# Patient Record
Sex: FEMALE | Race: WHITE | NOT HISPANIC OR LATINO | Employment: OTHER | URBAN - METROPOLITAN AREA
[De-identification: names, ages, dates, MRNs, and addresses within clinical notes are randomized per-mention and may not be internally consistent; named-entity substitution may affect disease eponyms.]

---

## 2017-07-22 ENCOUNTER — APPOINTMENT (EMERGENCY)
Dept: RADIOLOGY | Facility: HOSPITAL | Age: 82
End: 2017-07-22
Payer: COMMERCIAL

## 2017-07-22 ENCOUNTER — APPOINTMENT (EMERGENCY)
Dept: CT IMAGING | Facility: HOSPITAL | Age: 82
End: 2017-07-22
Payer: COMMERCIAL

## 2017-07-22 ENCOUNTER — HOSPITAL ENCOUNTER (EMERGENCY)
Facility: HOSPITAL | Age: 82
Discharge: HOME | End: 2017-07-22
Attending: EMERGENCY MEDICINE | Admitting: EMERGENCY MEDICINE
Payer: COMMERCIAL

## 2017-07-22 VITALS
BODY MASS INDEX: 22.14 KG/M2 | DIASTOLIC BLOOD PRESSURE: 83 MMHG | RESPIRATION RATE: 18 BRPM | OXYGEN SATURATION: 96 % | HEART RATE: 62 BPM | TEMPERATURE: 98.7 F | WEIGHT: 125 LBS | SYSTOLIC BLOOD PRESSURE: 197 MMHG

## 2017-07-22 DIAGNOSIS — S46.912A LEFT SHOULDER STRAIN, INITIAL ENCOUNTER: ICD-10-CM

## 2017-07-22 DIAGNOSIS — S29.011A CHEST WALL MUSCLE STRAIN, INITIAL ENCOUNTER: ICD-10-CM

## 2017-07-22 DIAGNOSIS — I10 ESSENTIAL HYPERTENSION: ICD-10-CM
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
## 2017-07-22 DIAGNOSIS — S70.02XA CONTUSION OF LEFT HIP, INITIAL ENCOUNTER: ICD-10-CM

## 2017-07-22 LAB
ANION GAP BLD CALC-SCNC: 19 MMOL/L (ref 4–13)
BUN BLD-MCNC: 14 MG/DL (ref 5–25)
CA-I BLD-SCNC: 1.2 MMOL/L (ref 1.12–1.32)
CHLORIDE BLD-SCNC: 103 MMOL/L (ref 100–108)
CREAT BLD-MCNC: 0.8 MG/DL (ref 0.6–1.3)
GFR SERPL CREATININE-BSD FRML MDRD: >60 ML/MIN/1.73SQ M
GLUCOSE SERPL-MCNC: 198 MG/DL (ref 65–140)
HCT VFR BLD CALC: 41 % (ref 34.8–46.1)
HGB BLDA-MCNC: 13.9 G/DL (ref 11.5–15.4)
PCO2 BLD: 25 MMOL/L (ref 21–32)
POTASSIUM BLD-SCNC: 3.7 MMOL/L (ref 3.5–5.3)
SODIUM BLD-SCNC: 142 MMOL/L (ref 136–145)
SPECIMEN SOURCE: ABNORMAL
SPECIMEN SOURCE: NORMAL
TROPONIN I BLD-MCNC: 0.01 NG/ML (ref 0–0.08)

## 2017-07-22 PROCEDURE — 93005 ELECTROCARDIOGRAM TRACING: CPT | Performed by: EMERGENCY MEDICINE

## 2017-07-22 PROCEDURE — 99284 EMERGENCY DEPT VISIT MOD MDM: CPT

## 2017-07-22 PROCEDURE — 71101 X-RAY EXAM UNILAT RIBS/CHEST: CPT

## 2017-07-22 PROCEDURE — 84484 ASSAY OF TROPONIN QUANT: CPT

## 2017-07-22 PROCEDURE — 73030 X-RAY EXAM OF SHOULDER: CPT

## 2017-07-22 PROCEDURE — 80047 BASIC METABLC PNL IONIZED CA: CPT

## 2017-07-22 PROCEDURE — 73502 X-RAY EXAM HIP UNI 2-3 VIEWS: CPT

## 2017-07-22 PROCEDURE — 85014 HEMATOCRIT: CPT

## 2017-07-22 PROCEDURE — 70450 CT HEAD/BRAIN W/O DYE: CPT

## 2017-07-22 RX ORDER — ASPIRIN 81 MG/1
81 TABLET, CHEWABLE ORAL DAILY
COMMUNITY
End: 2018-02-20

## 2017-07-22 RX ORDER — METOPROLOL TARTRATE 50 MG/1
50 TABLET, FILM COATED ORAL ONCE
Status: COMPLETED | OUTPATIENT
Start: 2017-07-22 | End: 2017-07-22

## 2017-07-22 RX ORDER — ACETAMINOPHEN 325 MG/1
650 TABLET ORAL ONCE
Status: COMPLETED | OUTPATIENT
Start: 2017-07-22 | End: 2017-07-22

## 2017-07-22 RX ADMIN — METOPROLOL TARTRATE 50 MG: 50 TABLET ORAL at 21:09

## 2017-07-22 RX ADMIN — ACETAMINOPHEN 650 MG: 325 TABLET ORAL at 20:46

## 2017-07-24 LAB
ATRIAL RATE: 70 BPM
P AXIS: 76 DEGREES
PR INTERVAL: 222 MS
QRS AXIS: -30 DEGREES
QRSD INTERVAL: 86 MS
QT INTERVAL: 434 MS
QTC INTERVAL: 468 MS
T WAVE AXIS: 261 DEGREES
VENTRICULAR RATE: 70 BPM

## 2018-01-15 NOTE — PROCEDURES
Procedures by BLU Rojas at 10/29/2016  2:50 PM      Author:  BLU Rojas Service:  (none) Author Type:  Speech and Language Pathologist     Filed:  10/29/2016  3:21 PM Date of Service:  10/29/2016  2:50 PM Status:  Signed     :  BLU Rojas (Speech and Language Pathologist)                                               Video Swallow Study      Patient Name: Pola King  Today's Date: 10/29/2016  par  par  Past Medical History  Past Medical History     Diagnosis  Date    Diabetes mellitus     Hypertension         Past Surgical History  Past Surgical History      Procedure  Laterality Date    Cardiac surgery      Abdominal aortic aneurysm repair           General Information:  General Information  Type of Study: Initial MBS  Past Medical History: Hx of hydronephrosis, HTN, DM2, falls  Limitations: Reduced attention (Pt distracted by lower back discomfort and by video images)  Positionin* upright in lateral view  Materials Adminstered: Puree, Soft/minced, Soft solid food, 1/2 Barium Pill with thin/thick liquid (did not transfer c thin liq x3 trials so then trialed with HTL), Mixed food c barium liquid  Oral Stage: Mild- Moderate impaired    Oral Phase - Comment: Dentures in place (uppers were loose fitting with movement noted during mastication c soft/solid food)  Reduced bolus formation c episodes of moderate oral residue noted c soft/solid food and mixed food/liquid boluses (mild residue  noted c puree/banana & liquids)  Episodes of mild premature spillage noted c mixed consistency item & consecutive sips of thin liquid   Pt also unable to transfer 1/2 pill when given with thin liquid (despite 3 trials, but transferred when given sip of  HTL)    Pharyngeal Stage:  WFL to Mild impaired with the limited materials taken this pm     Swallow Mechanics  Swallowing Initiation was[de-identified] Prompt, Mildly delayed  Hyloaryngeal Excursion was[de-identified] Adequate, Mildly limited  Tongue Drive was[de-identified] Adequate  Cricopharyngeal Opening/Relaxation was[de-identified] Adequate    Pharyngeal Comment: No aspiration with the limited food & liquids administered today but at risk 2* episodes of reduced attention & reduced oral control  Transient penetration noted c consecutive sips of thin liquid  Mild vallecular residue noted c foods  intermittently  Esophageal Stage:  No gross stasis in the limited with after the limited materials administered today  Assessment Summary:  Mr Ana María Starr presented c s/s mild/moderate oral dysphagia & mild pharyngeal dysphagia as described above  Recommendations:    Primary Recommendations: Oral feeding  Diet Texture: Dysphagia 2 mechanical soft  Liquid Consistency:  Thin  Liquid Administration: Cup, Straw, Individual sips  Medication Administration: Medication crushed  Suggested Postioning: Upright/ 90 degrees  Suggested Precautions: Feed fully upright position, Minimal distraction  Strategies:  SLP to continue skilled assessment/diagnostic tx to assure safe & EFFECTIVE intake of LRD (least restrictive diet), r/o need for strategies (eg to promote bolus cohesion & transfer/oral clearance etc)  Dysphagia Treatment Recommendations: As directed, By clincial status, By medical status                           Received for:Provider  [facility]   Oct 29 2016  3:22PM [facility] Standard Time

## 2018-02-20 ENCOUNTER — APPOINTMENT (EMERGENCY)
Dept: RADIOLOGY | Facility: HOSPITAL | Age: 83
DRG: 392 | End: 2018-02-20
Payer: COMMERCIAL

## 2018-02-20 ENCOUNTER — APPOINTMENT (EMERGENCY)
Dept: CT IMAGING | Facility: HOSPITAL | Age: 83
DRG: 392 | End: 2018-02-20
Payer: COMMERCIAL

## 2018-02-20 ENCOUNTER — HOSPITAL ENCOUNTER (INPATIENT)
Facility: HOSPITAL | Age: 83
LOS: 2 days | Discharge: HOME HEALTH | DRG: 392 | End: 2018-02-22
Attending: EMERGENCY MEDICINE | Admitting: INTERNAL MEDICINE
Payer: COMMERCIAL

## 2018-02-20 DIAGNOSIS — R11.10 VOMITING: ICD-10-CM

## 2018-02-20 DIAGNOSIS — I16.0 HYPERTENSIVE URGENCY: ICD-10-CM

## 2018-02-20 DIAGNOSIS — R55 SYNCOPE, UNSPECIFIED SYNCOPE TYPE: Primary | ICD-10-CM

## 2018-02-20 DIAGNOSIS — E11.65 TYPE 2 DIABETES MELLITUS WITH HYPERGLYCEMIA (HCC): Chronic | ICD-10-CM

## 2018-02-20 PROBLEM — R11.2 NAUSEA AND VOMITING: Status: ACTIVE | Noted: 2018-02-20

## 2018-02-20 LAB
ALBUMIN SERPL BCP-MCNC: 3.4 G/DL (ref 3.5–5)
ALP SERPL-CCNC: 93 U/L (ref 46–116)
ALT SERPL W P-5'-P-CCNC: 11 U/L (ref 12–78)
ANION GAP SERPL CALCULATED.3IONS-SCNC: 14 MMOL/L (ref 4–13)
AST SERPL W P-5'-P-CCNC: 11 U/L (ref 5–45)
BACTERIA UR QL AUTO: ABNORMAL /HPF
BASOPHILS # BLD AUTO: 0.07 THOUSANDS/ΜL (ref 0–0.1)
BASOPHILS NFR BLD AUTO: 0 % (ref 0–1)
BILIRUB SERPL-MCNC: 0.7 MG/DL (ref 0.2–1)
BILIRUB UR QL STRIP: NEGATIVE
BUN SERPL-MCNC: 14 MG/DL (ref 5–25)
CALCIUM SERPL-MCNC: 9.2 MG/DL (ref 8.3–10.1)
CHLORIDE SERPL-SCNC: 102 MMOL/L (ref 100–108)
CLARITY UR: CLEAR
CO2 SERPL-SCNC: 23 MMOL/L (ref 21–32)
COLOR UR: YELLOW
CREAT SERPL-MCNC: 1.1 MG/DL (ref 0.6–1.3)
EOSINOPHIL # BLD AUTO: 0.24 THOUSAND/ΜL (ref 0–0.61)
EOSINOPHIL NFR BLD AUTO: 1 % (ref 0–6)
ERYTHROCYTE [DISTWIDTH] IN BLOOD BY AUTOMATED COUNT: 13 % (ref 11.6–15.1)
GFR SERPL CREATININE-BSD FRML MDRD: 47 ML/MIN/1.73SQ M
GLUCOSE SERPL-MCNC: 246 MG/DL (ref 65–140)
GLUCOSE SERPL-MCNC: 257 MG/DL (ref 65–140)
GLUCOSE SERPL-MCNC: 283 MG/DL (ref 65–140)
GLUCOSE SERPL-MCNC: 284 MG/DL (ref 65–140)
GLUCOSE UR STRIP-MCNC: NEGATIVE MG/DL
HCT VFR BLD AUTO: 38.7 % (ref 34.8–46.1)
HGB BLD-MCNC: 13.1 G/DL (ref 11.5–15.4)
HGB UR QL STRIP.AUTO: ABNORMAL
KETONES UR STRIP-MCNC: NEGATIVE MG/DL
LEUKOCYTE ESTERASE UR QL STRIP: NEGATIVE
LYMPHOCYTES # BLD AUTO: 5.9 THOUSANDS/ΜL (ref 0.6–4.47)
LYMPHOCYTES NFR BLD AUTO: 35 % (ref 14–44)
MAGNESIUM SERPL-MCNC: 1.6 MG/DL (ref 1.6–2.6)
MCH RBC QN AUTO: 30 PG (ref 26.8–34.3)
MCHC RBC AUTO-ENTMCNC: 33.9 G/DL (ref 31.4–37.4)
MCV RBC AUTO: 89 FL (ref 82–98)
MONOCYTES # BLD AUTO: 1.68 THOUSAND/ΜL (ref 0.17–1.22)
MONOCYTES NFR BLD AUTO: 10 % (ref 4–12)
NEUTROPHILS # BLD AUTO: 9.12 THOUSANDS/ΜL (ref 1.85–7.62)
NEUTS SEG NFR BLD AUTO: 54 % (ref 43–75)
NITRITE UR QL STRIP: NEGATIVE
NON-SQ EPI CELLS URNS QL MICRO: ABNORMAL /HPF
OTHER CASTS: ABNORMAL
PH UR STRIP.AUTO: 6 [PH] (ref 4.5–8)
PLATELET # BLD AUTO: 350 THOUSANDS/UL (ref 149–390)
PMV BLD AUTO: 10.3 FL (ref 8.9–12.7)
POTASSIUM SERPL-SCNC: 3.1 MMOL/L (ref 3.5–5.3)
PROT SERPL-MCNC: 7.7 G/DL (ref 6.4–8.2)
PROT UR STRIP-MCNC: NEGATIVE MG/DL
RBC # BLD AUTO: 4.36 MILLION/UL (ref 3.81–5.12)
RBC #/AREA URNS AUTO: ABNORMAL /HPF
SODIUM SERPL-SCNC: 139 MMOL/L (ref 136–145)
SP GR UR STRIP.AUTO: 1.01 (ref 1–1.03)
TROPONIN I SERPL-MCNC: 0.02 NG/ML
UROBILINOGEN UR QL STRIP.AUTO: 0.2 E.U./DL
WBC # BLD AUTO: 17.01 THOUSAND/UL (ref 4.31–10.16)
WBC #/AREA URNS AUTO: ABNORMAL /HPF

## 2018-02-20 PROCEDURE — 87798 DETECT AGENT NOS DNA AMP: CPT | Performed by: EMERGENCY MEDICINE

## 2018-02-20 PROCEDURE — 83735 ASSAY OF MAGNESIUM: CPT | Performed by: EMERGENCY MEDICINE

## 2018-02-20 PROCEDURE — 80053 COMPREHEN METABOLIC PANEL: CPT | Performed by: EMERGENCY MEDICINE

## 2018-02-20 PROCEDURE — 85025 COMPLETE CBC W/AUTO DIFF WBC: CPT | Performed by: EMERGENCY MEDICINE

## 2018-02-20 PROCEDURE — 70450 CT HEAD/BRAIN W/O DYE: CPT

## 2018-02-20 PROCEDURE — 96375 TX/PRO/DX INJ NEW DRUG ADDON: CPT

## 2018-02-20 PROCEDURE — 82948 REAGENT STRIP/BLOOD GLUCOSE: CPT

## 2018-02-20 PROCEDURE — 36415 COLL VENOUS BLD VENIPUNCTURE: CPT | Performed by: EMERGENCY MEDICINE

## 2018-02-20 PROCEDURE — 81001 URINALYSIS AUTO W/SCOPE: CPT | Performed by: EMERGENCY MEDICINE

## 2018-02-20 PROCEDURE — 84484 ASSAY OF TROPONIN QUANT: CPT | Performed by: EMERGENCY MEDICINE

## 2018-02-20 PROCEDURE — 96365 THER/PROPH/DIAG IV INF INIT: CPT

## 2018-02-20 PROCEDURE — 93005 ELECTROCARDIOGRAM TRACING: CPT

## 2018-02-20 PROCEDURE — 84146 ASSAY OF PROLACTIN: CPT | Performed by: EMERGENCY MEDICINE

## 2018-02-20 PROCEDURE — 99223 1ST HOSP IP/OBS HIGH 75: CPT | Performed by: NURSE PRACTITIONER

## 2018-02-20 PROCEDURE — 99285 EMERGENCY DEPT VISIT HI MDM: CPT

## 2018-02-20 PROCEDURE — 71045 X-RAY EXAM CHEST 1 VIEW: CPT

## 2018-02-20 PROCEDURE — 96361 HYDRATE IV INFUSION ADD-ON: CPT

## 2018-02-20 RX ORDER — LABETALOL HYDROCHLORIDE 5 MG/ML
10 INJECTION, SOLUTION INTRAVENOUS EVERY 4 HOURS PRN
Status: DISCONTINUED | OUTPATIENT
Start: 2018-02-20 | End: 2018-02-20

## 2018-02-20 RX ORDER — ONDANSETRON 2 MG/ML
4 INJECTION INTRAMUSCULAR; INTRAVENOUS EVERY 6 HOURS PRN
Status: DISCONTINUED | OUTPATIENT
Start: 2018-02-20 | End: 2018-02-22 | Stop reason: HOSPADM

## 2018-02-20 RX ORDER — DILTIAZEM HYDROCHLORIDE 60 MG/1
60 TABLET, FILM COATED ORAL 2 TIMES DAILY
COMMUNITY

## 2018-02-20 RX ORDER — ONDANSETRON 2 MG/ML
4 INJECTION INTRAMUSCULAR; INTRAVENOUS ONCE
Status: COMPLETED | OUTPATIENT
Start: 2018-02-20 | End: 2018-02-20

## 2018-02-20 RX ORDER — CLOPIDOGREL BISULFATE 75 MG/1
75 TABLET ORAL DAILY
COMMUNITY

## 2018-02-20 RX ORDER — LABETALOL HYDROCHLORIDE 5 MG/ML
20 INJECTION, SOLUTION INTRAVENOUS EVERY 4 HOURS PRN
Status: DISCONTINUED | OUTPATIENT
Start: 2018-02-20 | End: 2018-02-22 | Stop reason: HOSPADM

## 2018-02-20 RX ORDER — LORAZEPAM 2 MG/ML
1 INJECTION INTRAMUSCULAR ONCE
Status: COMPLETED | OUTPATIENT
Start: 2018-02-20 | End: 2018-02-20

## 2018-02-20 RX ORDER — HYDRALAZINE HYDROCHLORIDE 20 MG/ML
10 INJECTION INTRAMUSCULAR; INTRAVENOUS EVERY 6 HOURS PRN
Status: DISCONTINUED | OUTPATIENT
Start: 2018-02-20 | End: 2018-02-22 | Stop reason: HOSPADM

## 2018-02-20 RX ORDER — SODIUM CHLORIDE 9 MG/ML
50 INJECTION, SOLUTION INTRAVENOUS CONTINUOUS
Status: DISCONTINUED | OUTPATIENT
Start: 2018-02-20 | End: 2018-02-22 | Stop reason: HOSPADM

## 2018-02-20 RX ORDER — POTASSIUM CHLORIDE 14.9 MG/ML
20 INJECTION INTRAVENOUS ONCE
Status: COMPLETED | OUTPATIENT
Start: 2018-02-20 | End: 2018-02-20

## 2018-02-20 RX ORDER — LABETALOL HYDROCHLORIDE 5 MG/ML
20 INJECTION, SOLUTION INTRAVENOUS ONCE
Status: COMPLETED | OUTPATIENT
Start: 2018-02-20 | End: 2018-02-20

## 2018-02-20 RX ADMIN — INSULIN LISPRO 2 UNITS: 100 INJECTION, SOLUTION INTRAVENOUS; SUBCUTANEOUS at 23:42

## 2018-02-20 RX ADMIN — LORAZEPAM 1 MG: 2 INJECTION INTRAMUSCULAR; INTRAVENOUS at 20:14

## 2018-02-20 RX ADMIN — SODIUM CHLORIDE 1000 ML: 0.9 INJECTION, SOLUTION INTRAVENOUS at 19:46

## 2018-02-20 RX ADMIN — SODIUM CHLORIDE 50 ML/HR: 0.9 INJECTION, SOLUTION INTRAVENOUS at 23:44

## 2018-02-20 RX ADMIN — ONDANSETRON 4 MG: 2 INJECTION INTRAMUSCULAR; INTRAVENOUS at 20:15

## 2018-02-20 RX ADMIN — ONDANSETRON 4 MG: 2 INJECTION INTRAMUSCULAR; INTRAVENOUS at 19:46

## 2018-02-20 RX ADMIN — SODIUM CHLORIDE 1000 ML: 0.9 INJECTION, SOLUTION INTRAVENOUS at 19:50

## 2018-02-20 RX ADMIN — LABETALOL HYDROCHLORIDE 20 MG: 5 INJECTION, SOLUTION INTRAVENOUS at 21:21

## 2018-02-20 RX ADMIN — POTASSIUM CHLORIDE 20 MEQ: 200 INJECTION, SOLUTION INTRAVENOUS at 20:30

## 2018-02-21 ENCOUNTER — APPOINTMENT (INPATIENT)
Dept: NEUROLOGY | Facility: HOSPITAL | Age: 83
DRG: 392 | End: 2018-02-21
Payer: COMMERCIAL

## 2018-02-21 ENCOUNTER — APPOINTMENT (INPATIENT)
Dept: NON INVASIVE DIAGNOSTICS | Facility: HOSPITAL | Age: 83
DRG: 392 | End: 2018-02-21
Payer: COMMERCIAL

## 2018-02-21 LAB
ANION GAP SERPL CALCULATED.3IONS-SCNC: 11 MMOL/L (ref 4–13)
ATRIAL RATE: 76 BPM
BUN SERPL-MCNC: 16 MG/DL (ref 5–25)
CALCIUM SERPL-MCNC: 8.4 MG/DL (ref 8.3–10.1)
CHLORIDE SERPL-SCNC: 106 MMOL/L (ref 100–108)
CO2 SERPL-SCNC: 20 MMOL/L (ref 21–32)
CREAT SERPL-MCNC: 0.98 MG/DL (ref 0.6–1.3)
ERYTHROCYTE [DISTWIDTH] IN BLOOD BY AUTOMATED COUNT: 13.1 % (ref 11.6–15.1)
FLUAV AG SPEC QL: NORMAL
FLUBV AG SPEC QL: NORMAL
GFR SERPL CREATININE-BSD FRML MDRD: 54 ML/MIN/1.73SQ M
GLUCOSE SERPL-MCNC: 126 MG/DL (ref 65–140)
GLUCOSE SERPL-MCNC: 156 MG/DL (ref 65–140)
GLUCOSE SERPL-MCNC: 162 MG/DL (ref 65–140)
GLUCOSE SERPL-MCNC: 167 MG/DL (ref 65–140)
GLUCOSE SERPL-MCNC: 195 MG/DL (ref 65–140)
HCT VFR BLD AUTO: 33 % (ref 34.8–46.1)
HGB BLD-MCNC: 11.1 G/DL (ref 11.5–15.4)
MCH RBC QN AUTO: 30.7 PG (ref 26.8–34.3)
MCHC RBC AUTO-ENTMCNC: 33.6 G/DL (ref 31.4–37.4)
MCV RBC AUTO: 91 FL (ref 82–98)
P AXIS: 64 DEGREES
PLATELET # BLD AUTO: 251 THOUSANDS/UL (ref 149–390)
PMV BLD AUTO: 10.4 FL (ref 8.9–12.7)
POTASSIUM SERPL-SCNC: 4 MMOL/L (ref 3.5–5.3)
PR INTERVAL: 228 MS
PROLACTIN SERPL-MCNC: 116 NG/ML
QRS AXIS: -39 DEGREES
QRSD INTERVAL: 88 MS
QT INTERVAL: 408 MS
QTC INTERVAL: 459 MS
RBC # BLD AUTO: 3.62 MILLION/UL (ref 3.81–5.12)
RSV B RNA SPEC QL NAA+PROBE: NORMAL
SODIUM SERPL-SCNC: 137 MMOL/L (ref 136–145)
T WAVE AXIS: 72 DEGREES
VENTRICULAR RATE: 76 BPM
WBC # BLD AUTO: 11.49 THOUSAND/UL (ref 4.31–10.16)

## 2018-02-21 PROCEDURE — 82948 REAGENT STRIP/BLOOD GLUCOSE: CPT

## 2018-02-21 PROCEDURE — 80048 BASIC METABOLIC PNL TOTAL CA: CPT | Performed by: NURSE PRACTITIONER

## 2018-02-21 PROCEDURE — 97163 PT EVAL HIGH COMPLEX 45 MIN: CPT

## 2018-02-21 PROCEDURE — 93010 ELECTROCARDIOGRAM REPORT: CPT | Performed by: INTERNAL MEDICINE

## 2018-02-21 PROCEDURE — G8979 MOBILITY GOAL STATUS: HCPCS

## 2018-02-21 PROCEDURE — 99222 1ST HOSP IP/OBS MODERATE 55: CPT | Performed by: INTERNAL MEDICINE

## 2018-02-21 PROCEDURE — 85027 COMPLETE CBC AUTOMATED: CPT | Performed by: NURSE PRACTITIONER

## 2018-02-21 PROCEDURE — 93306 TTE W/DOPPLER COMPLETE: CPT | Performed by: INTERNAL MEDICINE

## 2018-02-21 PROCEDURE — G8978 MOBILITY CURRENT STATUS: HCPCS

## 2018-02-21 PROCEDURE — 99232 SBSQ HOSP IP/OBS MODERATE 35: CPT | Performed by: HOSPITALIST

## 2018-02-21 PROCEDURE — 97530 THERAPEUTIC ACTIVITIES: CPT

## 2018-02-21 PROCEDURE — 95816 EEG AWAKE AND DROWSY: CPT

## 2018-02-21 PROCEDURE — 95816 EEG AWAKE AND DROWSY: CPT | Performed by: PSYCHIATRY & NEUROLOGY

## 2018-02-21 PROCEDURE — 93306 TTE W/DOPPLER COMPLETE: CPT

## 2018-02-21 RX ORDER — BENAZEPRIL HYDROCHLORIDE 10 MG/1
5 TABLET ORAL DAILY
Status: DISCONTINUED | OUTPATIENT
Start: 2018-02-21 | End: 2018-02-22 | Stop reason: HOSPADM

## 2018-02-21 RX ORDER — CLOPIDOGREL BISULFATE 75 MG/1
75 TABLET ORAL DAILY
Status: DISCONTINUED | OUTPATIENT
Start: 2018-02-21 | End: 2018-02-22 | Stop reason: HOSPADM

## 2018-02-21 RX ORDER — ATORVASTATIN CALCIUM 40 MG/1
40 TABLET, FILM COATED ORAL
Status: DISCONTINUED | OUTPATIENT
Start: 2018-02-21 | End: 2018-02-22 | Stop reason: HOSPADM

## 2018-02-21 RX ADMIN — SODIUM CHLORIDE 50 ML/HR: 0.9 INJECTION, SOLUTION INTRAVENOUS at 20:02

## 2018-02-21 RX ADMIN — BENAZEPRIL HYDROCHLORIDE 5 MG: 10 TABLET, FILM COATED ORAL at 12:46

## 2018-02-21 RX ADMIN — ATORVASTATIN CALCIUM 40 MG: 40 TABLET, FILM COATED ORAL at 16:48

## 2018-02-21 RX ADMIN — CLOPIDOGREL BISULFATE 75 MG: 75 TABLET ORAL at 12:41

## 2018-02-21 RX ADMIN — INSULIN LISPRO 1 UNITS: 100 INJECTION, SOLUTION INTRAVENOUS; SUBCUTANEOUS at 12:41

## 2018-02-21 RX ADMIN — INSULIN LISPRO 1 UNITS: 100 INJECTION, SOLUTION INTRAVENOUS; SUBCUTANEOUS at 16:49

## 2018-02-21 RX ADMIN — ENOXAPARIN SODIUM 40 MG: 40 INJECTION SUBCUTANEOUS at 08:21

## 2018-02-21 NOTE — ASSESSMENT & PLAN NOTE
-currently in sinus rhythm  -patient is not on anticoagulation home  -Cardizem was on hold  Patient was bradycardic overnight although she did receive a dose of labetalol    Consider restarting Cardizem today  -echocardiogram today  -cardiology consult

## 2018-02-21 NOTE — CASE MANAGEMENT
Initial Clinical Review    Admission: Date/Time/Statement: 2/20/18 @ 2127     Orders Placed This Encounter   Procedures    Inpatient Admission (expected length of stay for this patient is greater than two midnights)     Standing Status:   Standing     Number of Occurrences:   1     Order Specific Question:   Admitting Physician     Answer:   Julius Maki [[id number]]     Order Specific Question:   Level of Care     Answer:   Med Surg [16]     Order Specific Question:   Estimated length of stay     Answer:   More than 2 Midnights     Order Specific Question:   Certification     Answer:   I certify that inpatient services are medically necessary for this patient for a duration of greater than two midnights  See H&P and MD Progress Notes for additional information about the patient's course of treatment  ED: Date/Time/Mode of Arrival:   ED Arrival Information     Expected Arrival Acuity Means of Arrival Escorted By Service Admission Type    - 2/20/2018 19:05 Urgent Wheelchair Family Member General Medicine Urgent    Arrival Complaint    SEIZURE          Chief Complaint:   Chief Complaint   Patient presents with    Syncope     Vague description of pt sitting in chair at home and "became unresponsive and maybe had seizure "  Family denies any movement/shaking of extremities  Pt oriented upon arrival; vomited in car and upon arrival   c/o being tired today  History of Illness: 80 y o  female with history of PAF, dementia, diabetes mellitus type 2, and strokes who presents from home after short period of unresponsiveness  Family told ED doctor that pt has not been feeling well x 2 days  She was sitting down to eat dinner when she vomited and had period of unresponsiveness  Son did finger sweep to make sure pt would not choke on vomit  Pt regained consciousness   She was c/o dizziness and vomited several more times in route and upon arrival to ED  K-3 1, glucose-257, WBC-17 01  CT of head and cxr negative    ED Vital Signs:   ED Triage Vitals   Temperature Pulse Respirations Blood Pressure SpO2   02/20/18 1912 02/20/18 1912 02/20/18 1912 02/20/18 1912 02/20/18 1912   99 4 °F (37 4 °C) 78 16 (!) 187/93 97 %      Temp Source Heart Rate Source Patient Position - Orthostatic VS BP Location FiO2 (%)   02/20/18 1912 02/20/18 1912 02/20/18 2236 02/20/18 2015 --   Temporal Monitor Lying Right arm       Pain Score       02/20/18 1912       No Pain        Wt Readings from Last 1 Encounters:   02/20/18 59 8 kg (131 lb 13 4 oz)       Vital Signs (abnormal):  /93 - 185/84  Respiratory rate varies to 28  Maximum temperature 99 4   Exam - distressed  Generalized weakness  Diaphoretic  Active vomiting  Abnormal Labs/Diagnostic Test Results:   Prolactin 116  UA trace blood    k 3 1  Anion gap 14  Glucose 257  Alt 11  Albumin 3 4  Wbc 17 01    Ct head - no acute intracranial pathology    2322 glucose 284  Labs 2/21-  Glucose 167  CO2-20      Wbc 11 49, hgb 11 1, hct 33    ED Treatment:   Medication Administration from 02/20/2018 1905 to 02/20/2018 2226       Date/Time Order Dose Route Action Action by Comments     02/20/2018 2122 sodium chloride 0 9 % bolus 1,000 mL 0 mL Intravenous Stopped Temple Bullion, RN      02/20/2018 1950 sodium chloride 0 9 % bolus 1,000 mL 1,000 mL Intravenous [address] [employer] Bullion, RN      02/20/2018 1946 sodium chloride 0 9 % bolus 1,000 mL 1,000 mL Intravenous [address] [employer] Bullion, RN      02/20/2018 1946 ondansetron (ZOFRAN) injection 4 mg 4 mg Intravenous Given Temple Bullion, RN      02/20/2018 2014 LORazepam (ATIVAN) 2 mg/mL injection 1 mg 1 mg Intravenous Given Temple Bullion, RN      02/20/2018 2015 ondansetron (ZOFRAN) injection 4 mg 4 mg Intravenous Given Temple Bullion, RN      02/20/2018 2221 potassium chloride 20 mEq IVPB (premix) 0 mEq Intravenous Stopped Temple Bullion, RN      02/20/2018 2030 potassium chloride 20 mEq IVPB (premix) 20 mEq Intravenous New Bag Newton Del Castillo RN      02/20/2018 2121 labetalol (NORMODYNE) injection 20 mg 20 mg Intravenous Given Newton Del Castillo RN           Past Medical/Surgical History: Active Ambulatory Problems     Diagnosis Date Noted    Accelerated hypertension 10/26/2016    Type 2 diabetes mellitus with hyperglycemia [facility] ) 10/26/2016    Breast nodule 10/26/2016    Hypokalemia 10/28/2016    Left-sided weakness 10/28/2016    Dysphagia 10/28/2016    CVA (cerebral vascular accident) ([facility] ) 10/29/2016    [facility] 11/24/2016    Bleeding on Coumadin 11/24/2016    Traumatic cephalohematoma 11/24/2016    Aortic aneurysm [facility])      Resolved Ambulatory Problems     Diagnosis Date Noted    SIRS (systemic inflammatory response syndrome) [facility] ) 10/26/2016    Altered mental status, unspecified 10/26/2016    Fall 10/26/2016    Hydronephrosis of right kidney 10/26/2016    RAH (acute kidney injury) ([facility] ) 10/26/2016    Elevated lactic acid level 10/26/2016    Hematuria 10/26/2016    Tongue swelling 10/27/2016    Stroke (HCC)     Hypertension     Diabetes mellitus ([facility] )      Past Medical History:   Diagnosis Date    [facility]fib [facility] )     Aortic aneurysm (HCC)     Dementia     Diabetes mellitus ([facility] )     Hypertension     Intracranial hematoma (HCC)     Stroke ([facility] )     UTI (urinary tract infection)        Admitting Diagnosis: Seizure ([facility] ) [R56 9]  Vomiting [R11 10]  Hypertensive urgency [I16 0]  Syncope, unspecified syncope type [R55]    Age/Sex: 80 y o  female    Assessment/Plan:   Syncope   Assessment & Plan     Will monitor on telemetry  EKG with change in rhythm  Orthostatic BPs  Neuro checks Q4H  OOB with assist  Will obtain echocardiogram to R/O cardiac cause  EEG to R/O seizure            Nausea and vomiting   Assessment & Plan     May be viral in nature  WBC-17 01  Will keep NPO for now  Zofran PRN nausea and vomiting   Recheck BMP in am and replete electrolytes as needed            Type 2 diabetes mellitus with hyperglycemia ([facility] )   Assessment & Plan     Hold metformin while NPO  Monitor blood glucose QAC and HS and initiate SSI            Accelerated hypertension   Assessment & Plan     Pt NPO for now due to vomiting  Will hold benazepril and diltiazem  Add Labetalol 10mg IV PRN SBP>170  Monitor BP per nursing unit            Paroxysmal atrial fibrillation (HCC)   Assessment & Plan     Currently in SR with first degree AV block and EKG  Will continue to monitor on telemetry  Restart diltiazem and plavix when tolerating PO intake            Dementia   Assessment & Plan     Call bell in reach  OOB with assist            CVA (cerebral vascular accident) [facility])   Assessment & Plan     CT of head negative for acute infarct or hemorrhage  Restart Plavix when tolerating diet            Hypokalemia   Assessment & Plan     K-3 1 in ED  Repleted with KCL [id number]  Will recheck BMP in am and replete electrolytes as needed            Admission Orders:  TELE  2/20/2018 2125 INPATIENT   Scheduled Meds:   Current Facility-Administered Medications:  enoxaparin 40 mg Subcutaneous Daily TOR Gustafson    hydrALAZINE 10 mg Intravenous Q6H PRN TOR Chen    insulin lispro 1-5 Units Subcutaneous TID AC LolaBETZY FerrariNP    insulin lispro 1-5 Units Subcutaneous HS TOR Gustafson    labetalol 20 mg Intravenous Q4H PRN Merline Root MacritchBETZY armentaNP    ondansetron 4 mg Intravenous Q6H PRN Merline Root Macritchie, CRNP    sodium chloride 50 mL/hr Intravenous Continuous TOR Gustafson Last Rate: 50 mL/hr (02/20/18 2344)     Continuous Infusions:   sodium chloride 50 mL/hr Last Rate: 50 mL/hr (02/20/18 2344)     PRN Meds:nto used:   hydrALAZINE    labetalol    ondansetron     OTHER ORDERS:  Fingerstick glucose qid  Neuro checks q 4h  scds  NPO  Echo  EEG    Per attending on 2/12-   Nausea and vomiting   Assessment & Plan     -likely viral gastroenteritis  -advance diet as tolerated  -leukocytosis, present on admission, is improving  This is likely related to viral gastroenteritis           Paroxysmal atrial fibrillation [facility])   Assessment & Plan     -currently in sinus rhythm  -patient is not on anticoagulation home  -Cardizem was on hold  Patient was bradycardic overnight although she did receive a dose of labetalol  Consider restarting Cardizem today  -echocardiogram today  -cardiology consult          CVA (cerebral vascular accident) ([facility] )   Assessment & Plan     -restart Plavix  -start statin          Type 2 diabetes mellitus with hyperglycemia ([facility] )   Assessment & Plan     -restart metformin  -continue sliding scale insulin          Accelerated hypertension   Assessment & Plan     -restart ACE-inhibitor  -may need to increase ACE-inhibitor we cannot restart her Cardizem (bradycardia)  -currently hypertensive urgency          * Syncope   Assessment & Plan     -check echocardiogram  -continue to monitor on telemetry  -consult Cardiology  -likely vasovagal due to vomiting           Thank you,  [facility] in the [facility] by Randall Self for 2017  Network Utilization Review Department  Phone: [phone]; Fax [phone]  ATTENTION: The Network Utilization Review Department is now centralized for our 7 Facilities  Make a note that we have a new phone and fax numbers for our Department  Please call with any questions or concerns to [phone] and carefully follow the prompts so that you are directed to the right person  All voicemails are confidential  Fax any determinations, approvals, denials, and requests for initial or continue stay review clinical to [phone]  Due to HIGH CALL volume, it would be easier if you could please send faxed requests to expedite your requests and in part, help us provide discharge notifications faster

## 2018-02-21 NOTE — ASSESSMENT & PLAN NOTE
-likely viral gastroenteritis  -advance diet as tolerated  -leukocytosis, present on admission, is improving  This is likely related to viral gastroenteritis

## 2018-02-21 NOTE — PLAN OF CARE
Problem: PHYSICAL THERAPY ADULT  Goal: Performs mobility at highest level of function for planned discharge setting  See evaluation for individualized goals  Treatment/Interventions: ADL retraining, Functional transfer training, LE strengthening/ROM, Therapeutic exercise, Endurance training, Cognitive reorientation, Patient/family training, Equipment eval/education, Bed mobility, Gait training, Spoke to MD  Equipment Recommended: Pierre Mendenhall       See flowsheet documentation for full assessment, interventions and recommendations  Outcome: Progressing  Prognosis: Good     Assessment: Did well with rw for brief moiblity  Instr to ring for staff prior to toileting  Tx initiated for safe transfers form cahir reach and push   Will benefit form skill PT service to 1 final visit  to re-assess for longer distance See katelyn  Barriers to Discharge:  (medical status)     Recommendation: Home with family support, Home PT          See flowsheet documentation for full assessment

## 2018-02-21 NOTE — ASSESSMENT & PLAN NOTE
-restart ACE-inhibitor  -may need to increase ACE-inhibitor we cannot restart her Cardizem (bradycardia)  -currently hypertensive urgency

## 2018-02-21 NOTE — ASSESSMENT & PLAN NOTE
Currently in SR with first degree AV block and EKG  Will continue to monitor on telemetry  Restart diltiazem and plavix when tolerating PO intake

## 2018-02-21 NOTE — SOCIAL WORK
Met with patient  Explained role of care management  Patient lives with son Sloan King in a two story home with 2 DAYNE  Daughter in law, Vanessa Gama assists with adl's, uses RW to ambulate, family transports, provides meals  Patient states that her son and DIL work outside the home during the day, so she is alone  DME - RW, w/c  Past services - [facility] - not sure of agency  She is not sure if she wants [facility] to follow  [facility] for daughter Sg Monzon re: discharge plan

## 2018-02-21 NOTE — PROGRESS NOTES
Pt observed to be sleeping during hrly rounds between assessments; denies discomfort; neuro checks unchanged  Awake and responsive when spoken to  Incont lg amt foul smelling urine

## 2018-02-21 NOTE — ED NOTES
Called to CT - when moved onto CT table pt c/o dizziness and vomited    Orders received from Dr Gracie Carcamo, RN  02/20/18 2027

## 2018-02-21 NOTE — ASSESSMENT & PLAN NOTE
May be viral in nature  WBC-17 01  Will keep NPO for now  Zofran PRN nausea and vomiting  Recheck BMP in am and replete electrolytes as needed

## 2018-02-21 NOTE — ED NOTES
Contacted Med/surg to provide 10 min heads up  Memory Concetta (Nsg supervisor) states the room is not ready yet - they have to move patients, will call when bed/room is ready       Mariama Randhawa RN  02/20/18 3519

## 2018-02-21 NOTE — ASSESSMENT & PLAN NOTE
Will monitor on telemetry  EKG with change in rhythm  Orthostatic BPs  Neuro checks Q4H  OOB with assist  Will obtain echocardiogram to R/O cardiac cause  EEG to R/O seizure

## 2018-02-21 NOTE — CONSULTS
Consultation - Cardiology   Narda Harman 80 y o  female MRN: [id number]  Unit/Bed#: [address] [place] [address] [id number] Encounter: [id number]    Assessment/Plan     Assessment:  Syncope  PAF  History of CVA  Plan:    Her syncope was possibly vasovagal  She has a flow murmur on exam  Will review her echocardiogram once it is completed  She remains in sinus rhythm and has not had any heart block on tele  Maintain hydration  If echo unremarkable, then no further workup is needed  History of Present Illness   Physician Requesting Consult: Rony Dunn MD  Reason for Consult / Principal Problem: Syncope  HPI: Narda Harman is a 80y o  year old female who presents with an episode of syncope  The patient was eating dinner, developed nausea and vomiting and then lost consciousness  She was subsequently brought to the ER  She lives with her son  She previously saw a cardiologist in [address] [place] [address] 1788  She has a prior history of CVA and PAF  She is sedentary  At rest she has no chest pain, no dyspnea, no palpitations, no orthopnea, no PND  She has some mild edema  History is limited due to her dementia  Inpatient consult to Cardiology  Consult performed by: Marie Hylton  Consult ordered by: Mike Aparicio          Review of Systems   Constitutional: Negative  HENT: Negative  Eyes: Negative  Respiratory: Negative  Cardiovascular: Negative  Gastrointestinal: Negative  Endocrine: Negative  Genitourinary: Negative  Musculoskeletal: Negative  Skin: Negative  Allergic/Immunologic: Negative  Neurological: Positive for syncope  Hematological: Negative  Psychiatric/Behavioral: Positive for confusion         Historical Information   Past Medical History:   Diagnosis Date    A-fib [facility])     [facility]     small leak    Dementia     Diabetes mellitus ([facility] )     Hypertension     Intracranial hematoma (HCC)     Stroke ([facility] )     UTI (urinary tract infection)      Past Surgical History: Procedure Laterality Date    ABDOMINAL AORTIC ANEURYSM REPAIR      HYSTERECTOMY       History   Alcohol Use No     History   Drug Use No     History   Smoking Status    Never Smoker   Smokeless Tobacco    Never Used     Family History: History reviewed  No pertinent family history  Meds/Allergies   current meds:   Current Facility-Administered Medications   Medication Dose Route Frequency    atorvastatin (LIPITOR) tablet 40 mg  40 mg Oral Daily With Dinner    benazepril (LOTENSIN) tablet 5 mg  5 mg Oral Daily    clopidogrel (PLAVIX) tablet 75 mg  75 mg Oral Daily    enoxaparin (LOVENOX) subcutaneous injection 40 mg  40 mg Subcutaneous Daily    hydrALAZINE (APRESOLINE) injection 10 mg  10 mg Intravenous Q6H PRN    insulin lispro (HumaLOG) 100 units/mL subcutaneous injection 1-5 Units  1-5 Units Subcutaneous TID AC    insulin lispro (HumaLOG) 100 units/mL subcutaneous injection 1-5 Units  1-5 Units Subcutaneous HS    labetalol (NORMODYNE) injection 20 mg  20 mg Intravenous Q4H PRN    [START ON 2/22/2018] metFORMIN (GLUCOPHAGE) tablet 500 mg  500 mg Oral Daily With Breakfast    ondansetron (ZOFRAN) injection 4 mg  4 mg Intravenous Q6H PRN    sodium chloride 0 9 % infusion  50 mL/hr Intravenous Continuous     Allergies   Allergen Reactions    Demerol [Meperidine]     Morphine And Related     Penicillins     Vicodin [Hydrocodone-Acetaminophen]        Objective   Vitals: Blood pressure 128/61, pulse 78, temperature 98 7 °F (37 1 °C), temperature source Oral, resp  rate 18, height 5' 3" (1 6 m), weight 59 8 kg (131 lb 13 4 oz), SpO2 96 %    Orthostatic Blood Pressures    Flowsheet Row Most Recent Value   Blood Pressure  128/61 filed at 02/21/2018 8872   Patient Position - Orthostatic VS  Lying filed at 02/21/2018 0814            Intake/Output Summary (Last 24 hours) at 02/21/18 1424  Last data filed at 02/21/18 1138   Gross per 24 hour   Intake          1413 33 ml   Output              677 ml   Net 736 33 ml       Invasive Devices     Peripheral Intravenous Line            Peripheral IV 02/20/18 Left Arm 1 day                Physical Exam   Constitutional: She is oriented to person, place, and time  No distress  HENT:   Mouth/Throat: No oropharyngeal exudate  Eyes: No scleral icterus  Neck: No JVD present  Cardiovascular: Normal rate and regular rhythm  Murmur heard  Pulmonary/Chest: Effort normal and breath sounds normal  No respiratory distress  She has no wheezes  She has no rales  Abdominal: Soft  Bowel sounds are normal  She exhibits no distension  There is no tenderness  There is no rebound  Musculoskeletal: She exhibits edema  Neurological: She is alert and oriented to person, place, and time  Skin: Skin is warm and dry  She is not diaphoretic  Psychiatric: She has a normal mood and affect  Her behavior is normal        Lab Results:   I have personally reviewed pertinent lab results      CBC with diff:   Results from last 7 days  Lab Units 02/21/18  0512   WBC Thousand/uL 11 49*   RBC Million/uL 3 62*   HEMOGLOBIN g/dL 11 1*   HEMATOCRIT % 33 0*   MCV fL 91   MCH pg 30 7   MCHC g/dL 33 6   RDW % 13 1   MPV fL 10 4   PLATELETS Thousands/uL 251     CMP:   Results from last 7 days  Lab Units 02/21/18  0512 02/20/18  1922   SODIUM mmol/L 137 139   POTASSIUM mmol/L 4 0 3 1*   CHLORIDE mmol/L 106 102   CO2 mmol/L 20* 23   ANION GAP mmol/L 11 14*   BUN mg/dL 16 14   CREATININE mg/dL 0 98 1 10   GLUCOSE RANDOM mg/dL 156* 257*   CALCIUM mg/dL 8 4 9 2   AST U/L  --  11   ALT U/L  --  11*   ALK PHOS U/L  --  93   TOTAL PROTEIN g/dL  --  7 7   BILIRUBIN TOTAL mg/dL  --  0 70   EGFR ml/min/1 73sq m 54 47     Troponin:   0  Lab Value Date/Time   TROPONINI 0 02 02/20/2018 1922     BNP:   Results from last 7 days  Lab Units 02/21/18  0512   SODIUM mmol/L 137   POTASSIUM mmol/L 4 0   CHLORIDE mmol/L 106   CO2 mmol/L 20*   ANION GAP mmol/L 11   BUN mg/dL 16   CREATININE mg/dL 0 98   GLUCOSE RANDOM mg/dL 156*   CALCIUM mg/dL 8 4   EGFR ml/min/1 73sq m 54     Coags:     TSH:     Magnesium:   Results from last 7 days  Lab Units 02/20/18 1922   MAGNESIUM mg/dL 1 6     Lipid Profile:     Imaging: I have personally reviewed pertinent films in PACS  EKG: Normal Sinus Rhythm  Left Axis  Code Status: Level 3 - DNAR and DNI  Advance Directive and Living Will: Yes    Power of :    POLST:      Counseling / Coordination of Care  Total floor / unit time spent today 45 minutes  Greater than 50% of total time was spent with the patient and / or family counseling and / or coordination of care  A description of the counseling / coordination of care

## 2018-02-21 NOTE — ED PROVIDER NOTES
History  Chief Complaint   Patient presents with    Syncope     Vague description of pt sitting in chair at home and "became unresponsive and maybe had seizure "  Family denies any movement/shaking of extremities  Pt oriented upon arrival; vomited in car and upon arrival   c/o being tired today  Patient brought in by children, felt tired all day, had turkey burger and vomited twice just prior to arrival   Passed out about 10 minute timeframe per son  She was gagging on food so he did a finger sweep  She took a big breath in  He noticed twitching in an arm while she was passed out while sitting at kitchen table, witnessed by children  Vomited upon arrival to hospital   Feels dizzy  Prior to Admission Medications   Prescriptions Last Dose Informant Patient Reported? Taking? BENAZEPRIL HCL PO   Yes Yes   Sig: Take 5 mg by mouth daily   clopidogrel (PLAVIX) 75 mg tablet   Yes Yes   Sig: Take 75 mg by mouth daily   diltiazem (CARDIZEM) 60 mg tablet   Yes Yes   Sig: Take 60 mg by mouth 2 (two) times a day   metFORMIN (GLUCOPHAGE) 1000 MG tablet   Yes No   Sig: Take 500 mg by mouth daily with breakfast        Facility-Administered Medications: None       Past Medical History:   Diagnosis Date    A-fib ([place] [id number] )     Aortic aneurysm (HCC)     small leak    Dementia     Diabetes mellitus ([facility] )     Hypertension     Intracranial hematoma (HCC)     Stroke ([facility] )     UTI (urinary tract infection)        Past Surgical History:   Procedure Laterality Date    ABDOMINAL AORTIC ANEURYSM REPAIR      HYSTERECTOMY         History reviewed  No pertinent family history  I have reviewed and agree with the history as documented  Social History   Substance Use Topics    Smoking status: Never Smoker    Smokeless tobacco: Never Used    Alcohol use No        Review of Systems   Constitutional: Negative for chills and fever  HENT: Negative for rhinorrhea and sore throat      Respiratory: Negative for shortness of breath  Cardiovascular: Negative for chest pain  Gastrointestinal: Positive for nausea and vomiting  Negative for constipation and diarrhea  Genitourinary: Negative for dysuria and frequency  Skin: Negative for rash  Neurological: Positive for dizziness  All other systems reviewed and are negative  Physical Exam  ED Triage Vitals   Temperature Pulse Respirations Blood Pressure SpO2   02/20/18 1912 02/20/18 1912 02/20/18 1912 02/20/18 1912 02/20/18 1912   99 4 °F (37 4 °C) 78 16 (!) 187/93 97 %      Temp Source Heart Rate Source Patient Position - Orthostatic VS BP Location FiO2 (%)   02/20/18 1912 02/20/18 1912 02/20/18 2236 02/20/18 2015 --   Temporal Monitor Lying Right arm       Pain Score       02/20/18 1912       No Pain           Orthostatic Vital Signs  Vitals:    02/20/18 2145 02/20/18 2200 02/20/18 2236 02/20/18 2339   BP: 161/82 160/87 (!) 185/84 143/60   Pulse:   76 68   Patient Position - Orthostatic VS:   Lying        Physical Exam   Constitutional: She is oriented to person, place, and time  She appears distressed  HENT:   Mouth/Throat: Oropharynx is clear and moist    Eyes: Conjunctivae and EOM are normal  Pupils are equal, round, and reactive to light  Neck: Normal range of motion  Neck supple  Cardiovascular: Normal rate, regular rhythm, normal heart sounds and intact distal pulses  Pulmonary/Chest: Effort normal and breath sounds normal  No respiratory distress  She has no wheezes  Abdominal: Soft  Bowel sounds are normal  She exhibits no distension  There is no tenderness  Musculoskeletal: Normal range of motion  Generalized weakness   Neurological: She is alert and oriented to person, place, and time  No cranial nerve deficit or sensory deficit  She displays a negative Romberg sign  Skin: Skin is warm  She is diaphoretic  Nursing note and vitals reviewed        ED Medications  Medications   sodium chloride 0 9 % infusion (50 mL/hr Intravenous New Bag 2/20/18 2344)   ondansetron (ZOFRAN) injection 4 mg (not administered)   enoxaparin (LOVENOX) subcutaneous injection 40 mg (not administered)   insulin lispro (HumaLOG) 100 units/mL subcutaneous injection 1-5 Units (not administered)   insulin lispro (HumaLOG) 100 units/mL subcutaneous injection 1-5 Units (2 Units Subcutaneous Given 2/20/18 2342)   labetalol (NORMODYNE) injection 20 mg (not administered)   hydrALAZINE (APRESOLINE) injection 10 mg (not administered)   sodium chloride 0 9 % bolus 1,000 mL (0 mL Intravenous Stopped 2/20/18 2122)   ondansetron (ZOFRAN) injection 4 mg (4 mg Intravenous Given 2/20/18 1946)   LORazepam (ATIVAN) 2 mg/mL injection 1 mg (1 mg Intravenous Given 2/20/18 2014)   ondansetron (ZOFRAN) injection 4 mg (4 mg Intravenous Given 2/20/18 2015)   potassium chloride 20 mEq IVPB (premix) (0 mEq Intravenous Stopped 2/20/18 2221)   labetalol (NORMODYNE) injection 20 mg (20 mg Intravenous Given 2/20/18 2121)       Diagnostic Studies  Results Reviewed     Procedure Component Value Units Date/Time    Influenza A/B and RSV by PCR (indicated for patients >2 mo of age) [[id number]] Collected:  02/20/18 2126    Lab Status: In process Specimen:  Nasopharyngeal from Nasopharyngeal Swab Updated:  02/20/18 2344    Prolactin [[id number]] Collected:  02/20/18 1922    Lab Status:   In process Specimen:  Blood Updated:  02/20/18 2130    Urine Microscopic [[id number]]  (Abnormal) Collected:  02/20/18 2023    Lab Status:  Final result Specimen:  Urine from Urine, Straight Cath Updated:  02/20/18 2057     RBC, UA 1-2 (A) /hpf      WBC, UA 1-2 (A) /hpf      Epithelial Cells Occasional /hpf      Bacteria, UA None Seen /hpf      Other Casts Waxy Casts    Narrative:       0-1 Waxy Casts    UA w Reflex to Microscopic w Reflex to Culture [[id number]]  (Abnormal) Collected:  02/20/18 2023    Lab Status:  Final result Specimen:  Urine from Urine, Straight Cath Updated:  02/20/18 2046     Color, UA Yellow     Clarity, UA Clear     Specific [place], UA 1 010     pH, UA 6 0     Leukocytes, UA Negative     Nitrite, UA Negative     Protein, UA Negative mg/dl      Glucose, UA Negative mg/dl      Ketones, UA Negative mg/dl      Urobilinogen, UA 0 2 E U /dl      Bilirubin, UA Negative     Blood, UA Trace-Intact (A)    Magnesium [[id number]]  (Normal) Collected:  02/20/18 1922    Lab Status:  Final result Specimen:  Blood Updated:  02/20/18 2008     Magnesium 1 6 mg/dL     Troponin I [[id number]]  (Normal) Collected:  02/20/18 1922    Lab Status:  Final result Specimen:  Blood from Arm, Left Updated:  02/20/18 1947     Troponin I 0 02 ng/mL     Narrative:         [employer] Chemistry analyzer 99% cutoff is > 0 04 ng/mL in network labs    o cTnI 99% cutoff is useful only when applied to patients in the clinical setting of myocardial ischemia  o cTnI 99% cutoff should be interpreted in the context of clinical history, ECG findings and possibly cardiac imaging to establish correct diagnosis  o cTnI 99% cutoff may be suggestive but clearly not indicative of a coronary event without the clinical setting of myocardial ischemia  Comprehensive metabolic panel [[id number]]  (Abnormal) Collected:  02/20/18 1922    Lab Status:  Final result Specimen:  Blood from Arm, Left Updated:  02/20/18 1945     Sodium 139 mmol/L      Potassium 3 1 (L) mmol/L      Chloride 102 mmol/L      CO2 23 mmol/L      Anion Gap 14 (H) mmol/L      BUN 14 mg/dL      Creatinine 1 10 mg/dL      Glucose 257 (H) mg/dL      Calcium 9 2 mg/dL      AST 11 U/L      ALT 11 (L) U/L      Alkaline Phosphatase 93 U/L      Total Protein 7 7 g/dL      Albumin 3 4 (L) g/dL      Total Bilirubin 0 70 mg/dL      eGFR 47 ml/min/1 73sq m     Narrative:         National Kidney Disease Education Program recommendations are as follows:  GFR calculation is accurate only with a steady state creatinine  Chronic Kidney disease less than 60 ml/min/1 73 sq  meters  Kidney failure less than 15 ml/min/1 73 sq  meters  CBC and differential [[id number]]  (Abnormal) Collected:  02/20/18 1922    Lab Status:  Final result Specimen:  Blood from Arm, Left Updated:  02/20/18 1929     WBC 17 01 (H) Thousand/uL      RBC 4 36 Million/uL      Hemoglobin 13 1 g/dL      Hematocrit 38 7 %      MCV 89 fL      MCH 30 0 pg      MCHC 33 9 g/dL      RDW 13 0 %      MPV 10 3 fL      Platelets 847 Thousands/uL      Neutrophils Relative 54 %      Lymphocytes Relative 35 %      Monocytes Relative 10 %      Eosinophils Relative 1 %      Basophils Relative 0 %      Neutrophils Absolute 9 12 (H) Thousands/µL      Lymphocytes Absolute 5 90 (H) Thousands/µL      Monocytes Absolute 1 68 (H) Thousand/µL      Eosinophils Absolute 0 24 Thousand/µL      Basophils Absolute 0 07 Thousands/µL     Fingerstick Glucose (POCT) [[id number]]  (Abnormal) Collected:  02/20/18 1910    Lab Status:  Final result Updated:  02/20/18 1913     POC Glucose 246 (H) mg/dl                  XR chest portable   ED Interpretation by Leela Henriquez DO (02/20 2020)   No acute abnl      Final Result by Temo Cummings MD (02/20 2111)      No acute cardiopulmonary disease  Workstation performed: [id number]         CT head without contrast   Final Result by Desmond Bright MD (02/20 2016)      1  No acute intracranial pathology                  Workstation performed: [id number]                    Procedures  ECG 12 Lead Documentation  Date/Time: 2/20/2018 7:31 PM  Performed by: Juvenal Celeste by: Andrews Search     Indications / Diagnosis:  Syncope  ECG reviewed by me, the ED Provider: yes    Patient location:  ED  Previous ECG:     Previous ECG:  Compared to current    Comparison ECG info:  7-17    Similarity:  No change  Interpretation:     Interpretation: normal    Rate:     ECG rate:  76    ECG rate assessment: normal    Rhythm:     Rhythm: sinus rhythm    Ectopy:     Ectopy: none    QRS:     QRS axis:  Normal    QRS intervals:  Normal  Conduction: Conduction: abnormal      Abnormal conduction: 1st degree    ST segments:     ST segments:  Normal  T waves:     T waves: normal               Phone Contacts  ED Phone Contact    ED Course  ED Course as of Feb 21 0300 Tue Feb 20, 2018   1950 Extensive conversation                                MDM  Number of Diagnoses or Management Options  Hypertensive urgency: new and requires workup  Syncope, unspecified syncope type: new and requires workup  Vomiting: new and requires workup     Amount and/or Complexity of Data Reviewed  Clinical lab tests: ordered and reviewed  Tests in the radiology section of CPT®: ordered and reviewed  Obtain history from someone other than the patient: yes  Discuss the patient with other providers: yes    Patient Progress  Patient progress: improved    CritCare Time    Disposition  Final diagnoses:   Syncope, unspecified syncope type - acute   Hypertensive urgency   Vomiting     Time reflects when diagnosis was documented in both MDM as applicable and the Disposition within this note     Time User Action Codes Description Comment    2/20/2018  9:25 PM Sage Cork Add [R55] Syncope, unspecified syncope type     2/20/2018  9:25 PM Sage Cork Add [I16 0] Hypertensive urgency     2/20/2018  9:25 PM Sage Cork Add [R11 10] Vomiting     2/20/2018  9:25 PM Sage Cork Modify [R55] Syncope, unspecified syncope type acute      ED Disposition     ED Disposition Condition Comment    Admit  Case was discussed with *AMADEO Lerma NP** and the patient's admission status was agreed to be Admission Status: inpatient status to the service of Dr Maeve Reddy           Follow-up Information    None       Current Discharge Medication List      CONTINUE these medications which have NOT CHANGED    Details   BENAZEPRIL HCL PO Take 5 mg by mouth daily      clopidogrel (PLAVIX) 75 mg tablet Take 75 mg by mouth daily      diltiazem (CARDIZEM) 60 mg tablet Take 60 mg by mouth 2 (two) times a day      metFORMIN (GLUCOPHAGE) 1000 MG tablet Take 500 mg by mouth daily with breakfast             No discharge procedures on file      ED Provider  Electronically Signed by           Jacqui iHnkle DO  02/21/18 0303

## 2018-02-21 NOTE — ASSESSMENT & PLAN NOTE
Pt NPO for now due to vomiting  Will hold benazepril and diltiazem  Add Labetalol 10mg IV PRN SBP>170  Monitor BP per nursing unit

## 2018-02-21 NOTE — H&P
H&P- Gypsy Ugarte [place] 1934, 80 y o  female MRN: [id number]    Unit/Bed#: [address] [place] [address] Encounter: [id number]    Primary Care Provider: Yi Obrien DO   Date and time admitted to hospital: 2/20/2018  7:09 PM        * Syncope   Assessment & Plan    Will monitor on telemetry  EKG with change in rhythm  Orthostatic BPs  Neuro checks Q4H  OOB with assist  Will obtain echocardiogram to R/O cardiac cause  EEG to R/O seizure  Nausea and vomiting   Assessment & Plan    May be viral in nature  WBC-17 01  Will keep NPO for now  Zofran PRN nausea and vomiting  Recheck BMP in am and replete electrolytes as needed  Type 2 diabetes mellitus with hyperglycemia (HCC)   Assessment & Plan    Hold metformin while NPO  Monitor blood glucose QAC and HS and initiate SSI  Accelerated hypertension   Assessment & Plan    Pt NPO for now due to vomiting  Will hold benazepril and diltiazem  Add Labetalol 10mg IV PRN SBP>170  Monitor BP per nursing unit  Paroxysmal atrial fibrillation (HCC)   Assessment & Plan    Currently in SR with first degree AV block and EKG  Will continue to monitor on telemetry  Restart diltiazem and plavix when tolerating PO intake  Dementia   Assessment & Plan    Call bell in reach  OOB with assist          CVA (cerebral vascular accident) [facility])   Assessment & Plan    CT of head negative for acute infarct or hemorrhage  Restart Plavix when tolerating diet  Hypokalemia   Assessment & Plan    K-3 1 in ED  Repleted with KCL [id number]  Will recheck BMP in am and replete electrolytes as needed  VTE Prophylaxis: Enoxaparin (Lovenox)  / sequential compression device   Code Status: Level 3 DNR  POLST: There is no POLST form on file for this patient (pre-hospital)  Discussion with family: No family present    Anticipated Length of Stay:  Patient will be admitted on an Inpatient basis with an anticipated length of stay of  > 2 midnights     Justification for Amanuel Bowen Stay: Not medically stable for discharge    Total Time for Visit, including Counseling / Coordination of Care: 30 minutes  Greater than 50% of this total time spent on direct patient counseling and coordination of care  Chief Complaint:   Syncope    History of Present Illness:    Tonya Ledesma is a 80 y o  female with history of PAF, dementia, diabetes mellitus type 2, and strokes who presents from home after short period of unresponsiveness  Family told ED doctor that pt has not been feeling well x 2 days  She was sitting down to eat dinner when she vomited and had period of unresponsiveness  Son did finger sweep to make sure pt would not choke on vomit  Pt regained consciousness  She was c/o dizziness and vomited several more times in route and upon arrival to ED  K-3 1, glucose-257, WBC-17 01  CT of head and cxr negative  Pt denies fevers or chills  No diarrhea  Denies SOB or chest pain  Dizziness has resolved  Review of Systems:    Review of Systems   Constitutional: Negative for appetite change, chills and fever  Respiratory: Negative for apnea, cough and shortness of breath  Cardiovascular: Negative for chest pain, palpitations and leg swelling  Gastrointestinal: Positive for nausea and vomiting  Negative for abdominal distention, abdominal pain, constipation and diarrhea  Genitourinary: Negative for dysuria  Neurological: Positive for dizziness (resolved)  Negative for seizures, facial asymmetry, weakness, light-headedness, numbness and headaches  Psychiatric/Behavioral: Negative for agitation and confusion  The patient is not nervous/anxious  All other systems reviewed and are negative        Past Medical and Surgical History:     Past Medical History:   Diagnosis Date    [facility]fib [facility])     [facility] )     small leak    Dementia     Diabetes mellitus ([facility] )     Hypertension     Intracranial hematoma (HCC)     Stroke ([facility] )     UTI (urinary tract infection)        Past Surgical History:   Procedure Laterality Date    ABDOMINAL AORTIC ANEURYSM REPAIR      HYSTERECTOMY         Meds/Allergies:    Prior to Admission medications    Medication Sig Start Date End Date Taking?  Authorizing Provider   BENAZEPRIL HCL PO Take 5 mg by mouth daily   Yes Historical Provider, MD   clopidogrel (PLAVIX) 75 mg tablet Take 75 mg by mouth daily   Yes Historical Provider, MD   diltiazem (CARDIZEM) 60 mg tablet Take 60 mg by mouth 2 (two) times a day   Yes Historical Provider, MD   metFORMIN (GLUCOPHAGE) 1000 MG tablet Take 500 mg by mouth daily with breakfast      Historical Provider, MD   acetaminophen (TYLENOL) 325 mg tablet Take 2 tablets by mouth every 4 (four) hours as needed for mild pain or headaches 11/25/16 2/20/18  TOR Lopez   aspirin 81 mg chewable tablet Chew 81 mg daily  2/20/18  Historical Provider, MD   atorvastatin (LIPITOR) 40 mg tablet Take 40 mg by mouth daily  2/20/18  Historical Provider, MD   docusate sodium (COLACE) 100 mg capsule Take 1 capsule by mouth 2 (two) times a day for 30 days 11/25/16 2/20/18  TOR Lopez   lidocaine (LIDODERM) 5 % Place 1 patch on the skin daily for 30 days Remove & Discard patch within 12 hours or as directed by MD 11/1/16 2/20/18  Radha Wilhelm MD   lisinopril (ZESTRIL) 10 mg tablet Take 10 mg by mouth daily  2/20/18  Historical Provider, MD   methocarbamol (ROBAXIN) 500 mg tablet Take 500 mg by mouth every 6 (six) hours as needed for muscle spasms  2/20/18  Historical Provider, MD   metoprolol tartrate (LOPRESSOR) 50 mg tablet Take 50 mg by mouth 2 (two) times a day  2/20/18  Historical Provider, MD   oxyCODONE (OXY-IR) 5 MG capsule Take 5 mg by mouth every 4 (four) hours as needed for moderate pain  2/20/18  Historical Provider, MD   polyethylene glycol (MIRALAX) 17 g packet Take 17 g by mouth daily as needed (constipation) for up to 30 days 11/25/16 2/20/18  TOR Lopez     I have reviewed home medications with patient family member  Allergies: Allergies   Allergen Reactions    Demerol [Meperidine]     Morphine And Related     Penicillins     Vicodin [Hydrocodone-Acetaminophen]        Social History:     Marital Status:    Occupation: Retired   Patient Pre-hospital Living Situation: Lives with son  Patient Pre-hospital Level of Mobility: Uses walker  Patient Pre-hospital Diet Restrictions: None  Substance Use History:   History   Alcohol Use No     History   Smoking Status    Never Smoker   Smokeless Tobacco    Never Used     History   Drug Use No       Family History:    non-contributory    Physical Exam:     Vitals:   Blood Pressure: 160/87 (02/20/18 2200)  Pulse: 68 (02/20/18 2130)  Temperature: 98 8 °F (37 1 °C) (02/20/18 2115)  Temp Source: Tympanic (02/20/18 2115)  Respirations: (!) 57 (02/20/18 2200)  Weight - Scale: 56 7 kg (125 lb) (02/20/18 1912)  SpO2: 96 % (02/20/18 2115)    Physical Exam   Constitutional: She is oriented to person, place, and time  She appears well-developed and well-nourished  She is sleeping  She is easily aroused  No distress  HENT:   Head: Normocephalic and atraumatic  Eyes: EOM are normal  Pupils are equal, round, and reactive to light  Neck: Normal range of motion  Neck supple  Cardiovascular: Normal rate, regular rhythm, normal heart sounds and intact distal pulses  Exam reveals no gallop and no friction rub  No murmur heard  Pulmonary/Chest: Effort normal  No respiratory distress  She has decreased breath sounds  She has no wheezes  She has no rales  Abdominal: Soft  Bowel sounds are normal  She exhibits no distension  There is no tenderness  Musculoskeletal: Normal range of motion  She exhibits no edema  Neurological: She is oriented to person, place, and time and easily aroused  Skin: Skin is warm and dry  There is pallor  Psychiatric: She has a normal mood and affect   Her behavior is normal  Judgment and thought content normal  Nursing note and vitals reviewed  Additional Data:     Lab Results: I have personally reviewed pertinent reports  Results from last 7 days  Lab Units 02/20/18  1922   WBC Thousand/uL 17 01*   HEMOGLOBIN g/dL 13 1   HEMATOCRIT % 38 7   PLATELETS Thousands/uL 350   NEUTROS PCT % 54   LYMPHS PCT % 35   MONOS PCT % 10   EOS PCT % 1       Results from last 7 days  Lab Units 02/20/18  1922   SODIUM mmol/L 139   POTASSIUM mmol/L 3 1*   CHLORIDE mmol/L 102   CO2 mmol/L 23   BUN mg/dL 14   CREATININE mg/dL 1 10   CALCIUM mg/dL 9 2   TOTAL PROTEIN g/dL 7 7   BILIRUBIN TOTAL mg/dL 0 70   ALK PHOS U/L 93   ALT U/L 11*   AST U/L 11   GLUCOSE RANDOM mg/dL 257*           Imaging: I have personally reviewed pertinent reports  XR chest portable   ED Interpretation by Mauri Anglin DO (02/20 2020)   No acute abnl      Final Result by Jarrett Briceno MD (02/20 2111)      No acute cardiopulmonary disease  Workstation performed: [id number]         CT head without contrast   Final Result by Pamela Bryson MD (02/20 2016)      1  No acute intracranial pathology  Workstation performed: [id number]             EKG, Pathology, and Other Studies Reviewed on Admission:   · EKG: SR with first degree AV block    Allscripts / Epic Records Reviewed: Yes     ** Please Note: This note has been constructed using a voice recognition system   **

## 2018-02-21 NOTE — ED NOTES
Family left for home     Maco Yasmany, [facility]  02/20/18 5184

## 2018-02-21 NOTE — PLAN OF CARE
DISCHARGE PLANNING     Discharge to home or other facility with appropriate resources Progressing        GASTROINTESTINAL - ADULT     Minimal or absence of nausea and/or vomiting Progressing        HEMATOLOGIC - ADULT     Maintains hematologic stability Progressing        INFECTION - ADULT     Absence or prevention of progression during hospitalization Progressing        Knowledge Deficit     Patient/family/caregiver demonstrates understanding of disease process, treatment plan, medications, and discharge instructions Progressing        METABOLIC, FLUID AND ELECTROLYTES - ADULT     Electrolytes maintained within normal limits Progressing     Glucose maintained within target range Progressing        NEUROSENSORY - ADULT     Absence of seizures Progressing        PAIN - ADULT     Verbalizes/displays adequate comfort level or baseline comfort level Progressing        Potential for Falls     Patient will remain free of falls Progressing        SAFETY ADULT     Patient will remain free of falls Progressing        SKIN/TISSUE INTEGRITY - ADULT     Skin integrity remains intact Progressing

## 2018-02-21 NOTE — PROGRESS NOTES
Progress Note - Wayne Floor 1934, 80 y o  female MRN: [id number]    Unit/Bed#: [address] [place] [address] Encounter: [id number]    Primary Care Provider: John Stoner DO   Date and time admitted to hospital: 2/20/2018  7:09 PM        Nausea and vomiting   Assessment & Plan    -likely viral gastroenteritis  -advance diet as tolerated  -leukocytosis, present on admission, is improving  This is likely related to viral gastroenteritis  Paroxysmal atrial fibrillation (HCC)   Assessment & Plan    -currently in sinus rhythm  -patient is not on anticoagulation home  -Cardizem was on hold  Patient was bradycardic overnight although she did receive a dose of labetalol  Consider restarting Cardizem today  -echocardiogram today  -cardiology consult        CVA (cerebral vascular accident) [facility])   Stanislaw Parkinson    -restart Plavix  -start statin        Type 2 diabetes mellitus with hyperglycemia (HCC)   Assessment & Plan    -restart metformin  -continue sliding scale insulin        Accelerated hypertension   Assessment & Plan    -restart ACE-inhibitor  -may need to increase ACE-inhibitor we cannot restart her Cardizem (bradycardia)  -currently hypertensive urgency        * Syncope   Assessment & Plan    -check echocardiogram  -continue to monitor on telemetry  -consult Cardiology  -likely vasovagal due to vomiting          VTE Pharmacologic Prophylaxis:   Pharmacologic: Enoxaparin (Lovenox)  Mechanical VTE Prophylaxis in Place: Yes    Patient Centered Rounds: I have performed bedside rounds with nursing staff today  Education and Discussions with Family / Patient:  Patient    Time Spent for Care: 20 minutes  More than 50% of total time spent on counseling and coordination of care as described above      Current Length of Stay: 1 day(s)    Current Patient Status: Inpatient   Certification Statement: The patient will continue to require additional inpatient hospital stay due to Syncope    Discharge Plan:  Hopefully tomorrow    Code Status: Level 3 - DNAR and DNI      Subjective:   Patient denies chest pain, shortness of breath, nausea, vomiting, abdominal pain  Objective:     Vitals:   Temp (24hrs), Av 5 °F (36 9 °C), Min:97 5 °F (36 4 °C), Max:99 4 °F (37 4 °C)    HR:  [65-93] 78  Resp:  [] 18  BP: (122-199)/(60-93) 128/61  SpO2:  [93 %-98 %] 96 %  Body mass index is 23 35 kg/m²  Input and Output Summary (last 24 hours):        Intake/Output Summary (Last 24 hours) at 18 1128  Last data filed at 18 0602   Gross per 24 hour   Intake          1413 33 ml   Output              477 ml   Net           936 33 ml       Physical Exam:     Physical Exam  Gen: NAD, awake and alert, well developed, well nourished  Eyes: EOMI, PERRLA, no scleral icterus  ENMT:  Oropharynx clear of erythema or exudates, no nasal discharge, no otic discharge, moist mucous membranes  Neck:  Supple  Lymph:  No anterior or posterior cervical or supraclavicular lymphadenopathy  Cardiovascular:  Regular rate and rhythm, normal S1-S2, no murmurs, rubs, or gallops  Lungs:  Clear to auscultation bilaterally, no wheezes, or rales, or rhonchi  Abdomen:  Positive bowel sounds, soft, nontender, nondistended, no palpable organomegaly   Skin:  Intact, no obvious lesions or rashes, no edema  Neuro: Cranial nerves 2-12 are intact, non-focal, 5/5 strength in all 4 extremities      Additional Data:     Labs:      Results from last 7 days  Lab Units 18  0512 18  1922   WBC Thousand/uL 11 49* 17 01*   HEMOGLOBIN g/dL 11 1* 13 1   HEMATOCRIT % 33 0* 38 7   PLATELETS Thousands/uL 251 350   NEUTROS PCT %  --  54   LYMPHS PCT %  --  35   MONOS PCT %  --  10   EOS PCT %  --  1       Results from last 7 days  Lab Units 18  0512 18  1922   SODIUM mmol/L 137 139   POTASSIUM mmol/L 4 0 3 1*   CHLORIDE mmol/L 106 102   CO2 mmol/L 20* 23   BUN mg/dL 16 14   CREATININE mg/dL 0 98 1 10   CALCIUM mg/dL 8 4 9 2   TOTAL PROTEIN g/dL  --  7 7 BILIRUBIN TOTAL mg/dL  --  0 70   ALK PHOS U/L  --  93   ALT U/L  --  11*   AST U/L  --  11   GLUCOSE RANDOM mg/dL 156* 257*           * I Have Reviewed All Lab Data Listed Above  * Additional Pertinent Lab Tests Reviewed: Yajaira 66 Admission Reviewed      Recent Cultures (last 7 days):           Last 24 Hours Medication List:     Current Facility-Administered Medications:  atorvastatin 40 mg Oral Daily With Nia James MD    benazepril 5 mg Oral Daily Pao Tsai MD    clopidogrel 75 mg Oral Daily Pao Tsai MD    enoxaparin 40 mg Subcutaneous Daily TOR Zavaleta    hydrALAZINE 10 mg Intravenous Q6H PRN Xochilt Daily, CRNP    insulin lispro 1-5 Units Subcutaneous TID AC TOR Gustafson    insulin lispro 1-5 Units Subcutaneous HS TOR Gustafson    labetalol 20 mg Intravenous Q4H PRN Xochilt Daily, CRNP    [START ON 2/22/2018] metFORMIN 500 mg Oral Daily With Breakfast Pao Tsai MD    ondansetron 4 mg Intravenous Q6H PRN TOR Zavaleta    sodium chloride 50 mL/hr Intravenous Continuous TOR Gustafson Last Rate: 50 mL/hr (02/20/18 9810)        Today, Patient Was Seen By: Pao Tsai MD    ** Please Note: Dictation voice to text software may have been used in the creation of this document   **

## 2018-02-21 NOTE — ED NOTES
Family at bedside questioning this RN and Dr Aileen Ann "if the CT is really necessary if we aren't going to treat it - she is a DNI, DNR, [facility] etc  "  Dr Aileen Ann at bedside to discuss         Damian Cooper, LEOBARDO  02/20/18 1950

## 2018-02-21 NOTE — ASSESSMENT & PLAN NOTE
-check echocardiogram  -continue to monitor on telemetry  -consult Cardiology  -likely vasovagal due to vomiting

## 2018-02-21 NOTE — PHYSICAL THERAPY NOTE
PT eval/tx     02/21/18 1530   Note Type   Note type Eval/Treat   Pain Assessment   Pain Assessment No/denies pain   Pain Score No Pain   Home Living   Type of [address] Two level;Stairs to enter with rails   [address]; Wheelchair-manual   Prior Function   Level of [place] Needs assistance with IADLs; Needs assistance with ADLs and functional mobility   Lives With Son;Family   Receives Help From Family  (had HHD but not now)   ADL Assistance Needs assistance   IADLs Needs assistance   Vocational Retired   Comments agrees to tx   Edil's   Additional Pertinent History uses RW at home alone days family works   Family/Caregiver Present No   Cognition   Overall Cognitive Status WFL   Arousal/Participation Responsive   Orientation Level Oriented X4   Following Commands Follows one step commands without difficulty   RUE Assessment   RUE Assessment WFL   LUE Assessment   LUE Assessment WFL   RLE Assessment   RLE Assessment WFL   LLE Assessment   LLE Assessment WFL   Coordination   Movements are Fluid and Coordinated 1   Transfers   Sit to Stand 6  Modified independent   Additional items Armrests   Stand to Sit 6  Modified independent   Additional items Armrests   Stand pivot 5  Supervision   Additional items Increased time required   Ambulation/Elevation   Gait pattern Forward Flexion;Narrow FLYNN   Gait Assistance 5  Supervision   Additional items Verbal cues   Assistive Device Rolling walker   Distance 5'   Balance   Static Sitting Good   Dynamic Sitting Good   Static Standing Good   Dynamic Standing Fair +   Ambulatory Fair +   Endurance Deficit   Endurance Deficit No   Activity Tolerance   Activity Tolerance Patient tolerated treatment well   Nurse Made Aware yes   Assessment   Prognosis Good   Assessment Did well with rw for brief moiblity  Instr to ring for staff prior to toileting  Tx initiated for safe transfers form cahir reach and push   Will benefit form skill PT service to [address] [place] [address] visit  to re-assess for longer distance See katelyn   Barriers to Discharge (medical status)   Goals   Patient Goals et better  go home   Plan   Treatment/Interventions ADL retraining;Functional transfer training;LE strengthening/ROM; Therapeutic exercise; Endurance training;Cognitive reorientation;Patient/family training;Equipment eval/education; Bed mobility;Gait training;Spoke to MD   Recommendation   Recommendation Home with family support;Home PT   Equipment Recommended Walker   Barthel Index   Feeding 10   Bathing 0   Grooming Score 5   Dressing Score 5   Bladder Score 10   Bowels Score 10   Toilet Use Score 5   Transfers (Bed/Chair) Score 10   Mobility (Level Surface) Score 10   Stairs Score 5   Barthel Index Score 70   Terrie Jean Baptiste, PT

## 2018-02-22 VITALS
BODY MASS INDEX: 22.93 KG/M2 | RESPIRATION RATE: 18 BRPM | TEMPERATURE: 98.1 F | WEIGHT: 129.41 LBS | HEART RATE: 84 BPM | OXYGEN SATURATION: 95 % | SYSTOLIC BLOOD PRESSURE: 168 MMHG | HEIGHT: 63 IN | DIASTOLIC BLOOD PRESSURE: 78 MMHG

## 2018-02-22 LAB
GLUCOSE SERPL-MCNC: 188 MG/DL (ref 65–140)
GLUCOSE SERPL-MCNC: 191 MG/DL (ref 65–140)

## 2018-02-22 PROCEDURE — 97116 GAIT TRAINING THERAPY: CPT

## 2018-02-22 PROCEDURE — 82948 REAGENT STRIP/BLOOD GLUCOSE: CPT

## 2018-02-22 PROCEDURE — G8988 SELF CARE GOAL STATUS: HCPCS

## 2018-02-22 PROCEDURE — 97166 OT EVAL MOD COMPLEX 45 MIN: CPT

## 2018-02-22 PROCEDURE — 99239 HOSP IP/OBS DSCHRG MGMT >30: CPT | Performed by: HOSPITALIST

## 2018-02-22 PROCEDURE — 97530 THERAPEUTIC ACTIVITIES: CPT

## 2018-02-22 PROCEDURE — G8987 SELF CARE CURRENT STATUS: HCPCS

## 2018-02-22 RX ORDER — DILTIAZEM HYDROCHLORIDE 60 MG/1
60 TABLET, FILM COATED ORAL 2 TIMES DAILY
Status: DISCONTINUED | OUTPATIENT
Start: 2018-02-22 | End: 2018-02-22 | Stop reason: HOSPADM

## 2018-02-22 RX ORDER — ATORVASTATIN CALCIUM 40 MG/1
40 TABLET, FILM COATED ORAL
Qty: 30 TABLET | Refills: 0 | Status: SHIPPED | OUTPATIENT
Start: 2018-02-22

## 2018-02-22 RX ADMIN — METFORMIN HYDROCHLORIDE 500 MG: 500 TABLET, FILM COATED ORAL at 08:44

## 2018-02-22 RX ADMIN — HYDRALAZINE HYDROCHLORIDE 10 MG: 20 INJECTION INTRAMUSCULAR; INTRAVENOUS at 03:50

## 2018-02-22 RX ADMIN — INSULIN LISPRO 1 UNITS: 100 INJECTION, SOLUTION INTRAVENOUS; SUBCUTANEOUS at 08:46

## 2018-02-22 RX ADMIN — ENOXAPARIN SODIUM 40 MG: 40 INJECTION SUBCUTANEOUS at 08:45

## 2018-02-22 RX ADMIN — BENAZEPRIL HYDROCHLORIDE 5 MG: 10 TABLET, FILM COATED ORAL at 08:46

## 2018-02-22 RX ADMIN — CLOPIDOGREL BISULFATE 75 MG: 75 TABLET ORAL at 08:45

## 2018-02-22 NOTE — SOCIAL WORK
PT recommending [facility]/home PT  Spoke with patient and grandson about same  They are agreeable  Given freedom of choice    Referral via [facility] to [facility]/home PT

## 2018-02-22 NOTE — OCCUPATIONAL THERAPY NOTE
Occupational Therapy Evaluation      Alvester Rob Everrett Nageotte    2/22/2018    Patient Active Problem List   Diagnosis    Accelerated hypertension    Type 2 diabetes mellitus with hyperglycemia (HCC)    Breast nodule    Left-sided weakness    Dysphagia    [facility] )    Subdural hematoma (HCC)    Bleeding on Coumadin    Traumatic cephalohematoma    Dementia    Aortic aneurysm (HCC)    Paroxysmal atrial fibrillation (HCC)    Nausea and vomiting    Syncope       Past Medical History:   Diagnosis Date    A-fib [facility])     Aortic aneurysm (HCC)     small leak    Dementia     Diabetes mellitus ([facility] )     Hypertension     Intracranial hematoma (HCC)     [facility]     UTI (urinary tract infection)        Past Surgical History:   Procedure Laterality Date    ABDOMINAL AORTIC ANEURYSM REPAIR      HYSTERECTOMY        02/22/18 1009   Note Type   Note type Eval only   Restrictions/Precautions   Other Precautions Fall Risk;Multiple lines   Pain Assessment   Pain Assessment No/denies pain   Pain Score No Pain   Home Living   Type of [address] Able to live on main level with bedroom/bathroom   [address] [facility] Walker   Prior Function   Level of [place] Needs assistance with IADLs; Needs assistance with ADLs and functional mobility   Lives With Son;Family   Receives Help From Family   ADL Assistance Needs assistance   IADLs Needs assistance   Lifestyle   Autonomy Patient is ? historian, giving inconsistent reports regarding PLOF  Patient resides with son and d-i-l, reports d-i-l A with showers but patient states she can dress herself and toilet herself  Reports she grandson "Hesham Baxter" is laid-off and he has been helping her with ADLs as needed  Patient's family manages medications, tranportation, finances etc     Reciprocal Relationships Patient has supportive family involved with care    Service to Others retired    Intrinsic Gratification reports sedentary lifestyle, watches a lot of TV    Psychosocial   Psychosocial (WDL) WDL   Subjective   Subjective "I have help if I need it at home"   ADL   [address] 5  Supervision/Setup   Grooming Assistance 5  Supervision/Setup   UB Bathing Assistance 4  Minimal Assistance   LB Bathing Assistance 3  Moderate Assistance   [address] 3  Moderate Assistance   LB Dressing Assistance 3  Moderate Assistance   Toileting Assistance  4  Minimal Assistance   Transfers   Sit to Stand 5  Supervision   Additional items Assist x 1   Stand to Sit 5  Supervision   Additional items Assist x 1   Stand pivot 5  Supervision   Additional items Assist x 1   Functional Mobility   Functional Mobility 5  Supervision   Additional items Rolling walker   Balance   Static Sitting Fair +   Dynamic Sitting Fair   Static Standing Fair -   Dynamic Standing Poor +   Activity Tolerance   Activity Tolerance Patient tolerated treatment well  (Activity Tolerance: Fair )   RUE Assessment   RUE Assessment WFL   LUE Assessment   LUE Assessment WFL   Hand Function   Gross Motor Coordination Functional   Fine Motor Coordination Functional   Sensation   Light Touch No apparent deficits   Cognition   Overall Cognitive Status WFL  (suspect mild cogntive decline )   Arousal/Participation Alert   Attention Within functional limits   Orientation Level Oriented to person;Oriented to place;Oriented to situation   Memory Decreased recall of recent events;Decreased recall of biographical information  (giving inconsistent reports )   Following Commands Follows one step commands with increased time or repetition   Assessment   Limitation Decreased ADL status; Decreased Safe judgement during ADL;Decreased endurance;Decreased self-care trans   Prognosis Good   Assessment Patient is a 79 y/o female admitted with vomiting, diagnosed with viral gastroenteritis and syncope    Patient has hx of CVA, reports residing home with family support for ADLs and IADLs, states she is almost never alone  Patient currently presents with generalized weakness, decreased standing tolerance, decreased dynamic balance impacting basic ADL performance in areas of bathing, dressing, toileting, ADL transfers and functional mobility  From OT standpoint, feel patient can return home with family support upon discharge however would recommend [facility] PT and OT services  Will continue to follow for acute care OT services to progress basic self-care to highest level of indepdence and safety with least amount of caregiver physical and/or cogntive assistance  Goals   Patient Goals "I'm ready to go home"   LTG Time Frame 3-7   Long Term Goal #1 1  Patient will increase toileting to Mod I level 2  Patient will increase ADL transfers to Mod I level with G safety awarness; 3  Patient will increase UB bathing and dressing to SBA level; 4  Patient will increase activity tolerance to G for basic grooming in stance at sink with F+ balance    Plan   Treatment Interventions ADL retraining;Functional transfer training; Endurance training;Patient/family training   Goal Expiration Date 03/01/18   OT Frequency 2-3x/wk   Recommendation   Recommendation PT consult   OT Discharge Recommendation Home OT   OT - OK to Discharge Yes  (When medically stable )   Barthel Index   Feeding 5   Bathing 0   Grooming Score 0   Dressing Score 5   Bladder Score 5   Bowels Score 5   Toilet Use Score 5   Transfers (Bed/Chair) Score 10   Mobility (Level Surface) Score 10   Stairs Score 0   Barthel Index Score 45   Modified Ingalls Scale   Modified Ingalls Scale 4   Edita Banks OT

## 2018-02-22 NOTE — PLAN OF CARE
Problem: PHYSICAL THERAPY ADULT  Goal: Performs mobility at highest level of function for planned discharge setting  See evaluation for individualized goals  Treatment/Interventions: ADL retraining, Functional transfer training, LE strengthening/ROM, Therapeutic exercise, Endurance training, Cognitive reorientation, Patient/family training, Equipment eval/education, Bed mobility, Gait training, Spoke to MD  Equipment Recommended: Sandra Hendrickson       See flowsheet documentation for full assessment, interventions and recommendations  Outcome: Adequate for Discharge  Prognosis: Good     Assessment: Pt does well with amb with rw  Appeasr near baseline level of function adn appropriate to return home once medically cleared  Recommend home PT safety check  Barriers to Discharge: None     Recommendation: Home with family support, Home PT     PT - OK to Discharge: Yes    See flowsheet documentation for full assessment

## 2018-02-22 NOTE — PLAN OF CARE
Problem: OCCUPATIONAL THERAPY ADULT  Goal: Performs self-care activities at highest level of function for planned discharge setting  See evaluation for individualized goals  Treatment Interventions: ADL retraining, Functional transfer training, Endurance training, Patient/family training          See flowsheet documentation for full assessment, interventions and recommendations  Limitation: Decreased ADL status, Decreased Safe judgement during ADL, Decreased endurance, Decreased self-care trans  Prognosis: Good  Assessment: Patient is a 79 y/o female admitted with vomiting, diagnosed with viral gastroenteritis and syncope  Patient has hx of CVA, reports residing home with family support for ADLs and IADLs, states she is almost never alone  Patient currently presents with generalized weakness, decreased standing tolerance, decreased dynamic balance impacting basic ADL performance in areas of bathing, dressing, toileting, ADL transfers and functional mobility  From OT standpoint, feel patient can return home with family support upon discharge however would recommend [facility] and OT services  Will continue to follow for acute care OT services to progress basic self-care to highest level of indepdence and safety with least amount of caregiver physical and/or cogntive assistance      Recommendation: PT consult  OT Discharge Recommendation: Home OT  OT - OK to Discharge: Yes (When medically stable )

## 2018-02-22 NOTE — ASSESSMENT & PLAN NOTE
-Pt was not orthostatic  -Echo: EF 65%, no RWMA  -likely vasovagal due to vomiting due to viral gastroenteritis  -appreciate Cardiology

## 2018-02-22 NOTE — PHYSICAL THERAPY NOTE
PT tx     02/22/18 1018   Pain Assessment   Pain Assessment No/denies pain   Pain Score No Pain   General   Chart Reviewed Yes   Cognition   Overall Cognitive Status WFL   Arousal/Participation Alert   Attention Within functional limits   Orientation Level Oriented X4   Memory Within functional limits   Following Commands Follows all commands and directions without difficulty   Subjective   Subjective slept well feels good hopes for d/c today   Transfers   Sit to Stand 5  Supervision   Additional items Verbal cues   Stand to Sit 5  Supervision   Stand pivot 5  Supervision   Ambulation/Elevation   Gait pattern WNL; Forward Flexion   Gait Assistance 6  Modified independent   Additional items (assist for iv pole)   Assistive Device Rolling walker   Distance 75'   Balance   Static Sitting Normal   Dynamic Sitting Good   Static Standing Fair +   Dynamic Standing Fair +   Ambulatory Fair +   Endurance Deficit   Endurance Deficit No   Activity Tolerance   Activity Tolerance Patient tolerated treatment well   Nurse Made Aware yes   Assessment   Prognosis Good   Assessment Pt does well with amb with rw  Appeasr near baseline level of function adn appropriate to return home once medically cleared  Recommend home PT safety check      Barriers to Discharge None   Goals   Patient Goals go home   Plan   Treatment/Interventions ADL retraining;Gait training   Progress Improving as expected   PT Frequency Follow-up visit only   Recommendation   Recommendation Home with family support;Home PT   Equipment Recommended Sandra Hendrickson  (has own at home)   PT - OK to Discharge Yes   Kyrie Check, PT

## 2018-02-22 NOTE — DISCHARGE SUMMARY
Discharge- Joy De La Rosa 1934, 80 y o  female MRN: [id number]    Unit/Bed#: [address] [place] [address] Encounter: [id number]    Primary Care Provider: Ele Willard DO   Date and time admitted to hospital: 2/20/2018  7:09 PM        Nausea and vomiting   Assessment & Plan    -was likely viral in nature   -Leukocytosis 11 from 17         Paroxysmal atrial fibrillation (HCC)   Assessment & Plan    -Was in [place] with first degree AV block on EKG  -no heart block on telemetry    -cont cardizem         CVA (cerebral vascular accident) ([facility] )   Assessment & Plan    -CT of head negative for acute infarct or hemorrhage    -cont Plavix        Type 2 diabetes mellitus with hyperglycemia (HCC)   Assessment & Plan    -Metformin was initially held while NPO then was restarted  Accelerated hypertension   Assessment & Plan    -Pt was NPO and initially benazepril and diltiazem were held  Both were restarted prior to d/c  * Syncope   Assessment & Plan    -Pt was not orthostatic  -Echo: EF 65%, no RWMA  -likely vasovagal due to vomiting due to viral gastroenteritis  -appreciate Cardiology            Resolved Problems  Date Reviewed: 2/22/2018          Resolved    Hypokalemia 2/21/2018     Resolved by  Ash Singleton MD          Consultations During Hospital Stay:  · Cardiology    Procedures Performed:     · None    Significant Findings / Test Results:     · See above    Incidental Findings:   · None    Test Results Pending at Discharge (will require follow up): · None     Outpatient Tests Requested:  · None    Complications:  None    Reason for Admission:  Nausea vomiting and syncope    Hospital Course:     Joy De La Rosa is a 80 y o  female patient who originally presented to the hospital on 2/20/2018 due to nausea vomiting and syncope as outlined in the H&P done on admission  Please see above list of diagnoses and related plan for additional information       Condition at Discharge: good     Discharge Day Visit / Exam: Subjective:  No acute complaints  Vitals: Blood Pressure: 168/78 (02/22/18 0807)  Pulse: 84 (02/22/18 0737)  Temperature: 98 1 °F (36 7 °C) (02/22/18 0737)  Temp Source: Oral (02/22/18 0737)  Respirations: 18 (02/22/18 0737)  Height: 5' 3" (160 cm) (02/21/18 2231)  Weight - Scale: 58 7 kg (129 lb 6 6 oz) (02/22/18 0737)  SpO2: 95 % (02/22/18 0737)  Exam:   Physical Exam  Gen: NAD, awake and alert, well developed, well nourished  Eyes: EOMI, PERRLA, no scleral icterus  ENMT:  Oropharynx clear of erythema or exudates, no nasal discharge, no otic discharge, moist mucous membranes  Neck:  Supple  Lymph:  No anterior or posterior cervical or supraclavicular lymphadenopathy  Cardiovascular:  Regular rate and rhythm, normal S1-S2, no murmurs, rubs, or gallops  Lungs:  Clear to auscultation bilaterally, no wheezes, or rales, or rhonchi  Abdomen:  Positive bowel sounds, soft, nontender, nondistended, no palpable organomegaly   Skin:  Intact, no obvious lesions or rashes, no edema  Neuro: Cranial nerves 2-12 are intact, non-focal, 5/5 strength in all 4 extremities    Discharge instructions/Information to patient and family:   See after visit summary for information provided to patient and family  Provisions for Follow-Up Care:  See after visit summary for information related to follow-up care and any pertinent home health orders  Disposition:     Home with VNA Services (Reminder: Complete face to face encounter)    For Discharges to   [address] SN[facility]:   · Not Applicable to this Patient - Not Applicable to this Patient    Planned Readmission: None     Discharge Statement:  I spent 30 minutes discharging the patient  This time was spent on the day of discharge  I had direct contact with the patient on the day of discharge   Greater than 50% of the total time was spent examining patient, answering all patient questions, arranging and discussing plan of care with patient as well as directly providing post-discharge instructions  Additional time then spent on discharge activities  Discharge Medications:  See after visit summary for reconciled discharge medications provided to patient and family        ** Please Note: This note has been constructed using a voice recognition system **

## 2018-02-22 NOTE — ASSESSMENT & PLAN NOTE
-Was in [place] KAREN Cruz with first degree AV block on EKG     -no heart block on telemetry    -cont cardizem